# Patient Record
Sex: FEMALE
[De-identification: names, ages, dates, MRNs, and addresses within clinical notes are randomized per-mention and may not be internally consistent; named-entity substitution may affect disease eponyms.]

---

## 2018-04-11 ENCOUNTER — HOSPITAL ENCOUNTER (OUTPATIENT)
Dept: HOSPITAL 74 - FASU-ENDO | Age: 58
Discharge: HOME | End: 2018-04-11
Attending: INTERNAL MEDICINE
Payer: COMMERCIAL

## 2018-04-11 VITALS — HEART RATE: 85 BPM | DIASTOLIC BLOOD PRESSURE: 61 MMHG | SYSTOLIC BLOOD PRESSURE: 93 MMHG

## 2018-04-11 VITALS — BODY MASS INDEX: 26.9 KG/M2

## 2018-04-11 VITALS — TEMPERATURE: 97.8 F

## 2018-04-11 DIAGNOSIS — R93.3: ICD-10-CM

## 2018-04-11 DIAGNOSIS — K63.5: Primary | ICD-10-CM

## 2018-04-11 DIAGNOSIS — K57.30: ICD-10-CM

## 2018-04-11 PROCEDURE — 0DBN8ZX EXCISION OF SIGMOID COLON, VIA NATURAL OR ARTIFICIAL OPENING ENDOSCOPIC, DIAGNOSTIC: ICD-10-PCS | Performed by: INTERNAL MEDICINE

## 2018-04-13 NOTE — PATH
Surgical Pathology Report



Patient Name:  SATNAM CABRERA

Accession #:  

Med. Rec. #:  [id number]                                                        

   /Age/Gender:  1960 (Age: 57) / F

Account:  [id number]                                                          

             Location: [facility]-ENDOSCOPY

Taken:  2018

Received:  2018

Reported:  2018

Physicians:  July Carter M.D.

  



Specimen(s) Received

 BIOPSY DESCENDING COLON POLYPS 





Clinical History

Abdominal pain

Postoperative diagnosis: Polyps







Final Diagnosis

DESCENDING COLON, POLYPS, BIOPSY:

HYPERPLASTIC POLYP.

COLONIC MUCOSA WITH PROMINENT LYMPHOID AGGREGATE.





***Electronically Signed***

July Fernandez M.D.





Gross Description

Received in formalin, labeled "descending colon polyps" are 4 tan, irregular

portions of soft tissue ranging in size from 0.1-0.2 cm. in greatest dimension.

The specimens are submitted in toto in one cassette.

DUANE/2018



lila/2018

## 2022-10-05 ENCOUNTER — HOSPITAL ENCOUNTER (OUTPATIENT)
Dept: HOSPITAL 74 - FASU-ENDO | Age: 62
Discharge: HOME | End: 2022-10-05
Attending: INTERNAL MEDICINE
Payer: COMMERCIAL

## 2022-10-05 VITALS — BODY MASS INDEX: 22.3 KG/M2

## 2022-10-05 VITALS
HEART RATE: 66 BPM | SYSTOLIC BLOOD PRESSURE: 102 MMHG | DIASTOLIC BLOOD PRESSURE: 67 MMHG | TEMPERATURE: 97.5 F | RESPIRATION RATE: 19 BRPM

## 2022-10-05 DIAGNOSIS — R10.13: ICD-10-CM

## 2022-10-05 DIAGNOSIS — K31.9: ICD-10-CM

## 2022-10-05 DIAGNOSIS — K21.00: ICD-10-CM

## 2022-10-05 DIAGNOSIS — K29.50: Primary | ICD-10-CM

## 2022-10-05 DIAGNOSIS — B96.81: ICD-10-CM

## 2022-10-05 PROCEDURE — 0DB98ZX EXCISION OF DUODENUM, VIA NATURAL OR ARTIFICIAL OPENING ENDOSCOPIC, DIAGNOSTIC: ICD-10-PCS | Performed by: INTERNAL MEDICINE

## 2022-10-05 PROCEDURE — 0DB68ZX EXCISION OF STOMACH, VIA NATURAL OR ARTIFICIAL OPENING ENDOSCOPIC, DIAGNOSTIC: ICD-10-PCS | Performed by: INTERNAL MEDICINE

## 2022-10-05 PROCEDURE — 0DB48ZX EXCISION OF ESOPHAGOGASTRIC JUNCTION, VIA NATURAL OR ARTIFICIAL OPENING ENDOSCOPIC, DIAGNOSTIC: ICD-10-PCS | Performed by: INTERNAL MEDICINE

## 2023-10-25 ENCOUNTER — HOSPITAL ENCOUNTER (OUTPATIENT)
Dept: HOSPITAL 74 - FASU-ENDO | Age: 63
Discharge: HOME | End: 2023-10-25
Attending: INTERNAL MEDICINE
Payer: COMMERCIAL

## 2023-10-25 VITALS — BODY MASS INDEX: 23.3 KG/M2

## 2023-10-25 VITALS — DIASTOLIC BLOOD PRESSURE: 76 MMHG | SYSTOLIC BLOOD PRESSURE: 101 MMHG | HEART RATE: 81 BPM

## 2023-10-25 VITALS — TEMPERATURE: 98.1 F

## 2023-10-25 VITALS — RESPIRATION RATE: 18 BRPM

## 2023-10-25 DIAGNOSIS — K57.30: ICD-10-CM

## 2023-10-25 DIAGNOSIS — R10.84: ICD-10-CM

## 2023-10-25 DIAGNOSIS — K64.1: ICD-10-CM

## 2023-10-25 DIAGNOSIS — Z12.11: Primary | ICD-10-CM

## 2023-10-25 DIAGNOSIS — K64.8: ICD-10-CM

## 2023-10-25 PROCEDURE — 0DJD8ZZ INSPECTION OF LOWER INTESTINAL TRACT, VIA NATURAL OR ARTIFICIAL OPENING ENDOSCOPIC: ICD-10-PCS | Performed by: INTERNAL MEDICINE
